# Patient Record
Sex: MALE | Race: WHITE | Employment: FULL TIME | URBAN - METROPOLITAN AREA
[De-identification: names, ages, dates, MRNs, and addresses within clinical notes are randomized per-mention and may not be internally consistent; named-entity substitution may affect disease eponyms.]

---

## 2017-03-08 ENCOUNTER — TELEPHONE (OUTPATIENT)
Dept: INTERNAL MEDICINE CLINIC | Facility: CLINIC | Age: 42
End: 2017-03-08

## 2017-03-08 NOTE — TELEPHONE ENCOUNTER
At visit in November 2016 patient was diagnosed with DM. Did not ever attend DM education. Has not followed up since. Writer left voice message for patient to schedule follow up office visit for DM follow up and also have DM eye exam done.

## 2017-04-13 ENCOUNTER — TELEPHONE (OUTPATIENT)
Dept: ENDOCRINOLOGY CLINIC | Facility: CLINIC | Age: 42
End: 2017-04-13

## 2017-04-17 DIAGNOSIS — E11.9 TYPE 2 DIABETES MELLITUS WITHOUT COMPLICATION, WITHOUT LONG-TERM CURRENT USE OF INSULIN (HCC): Primary | ICD-10-CM

## 2017-04-17 RX ORDER — ESCITALOPRAM OXALATE 10 MG/1
TABLET ORAL
Qty: 30 TABLET | Refills: 0 | Status: SHIPPED | OUTPATIENT
Start: 2017-04-17 | End: 2018-12-19

## 2017-04-17 NOTE — TELEPHONE ENCOUNTER
E request received  LOV: 11/17/16- with JV- physical  Last rx 12/5/16- 30 with 1   Per protocol to provider

## 2017-05-18 ENCOUNTER — PATIENT OUTREACH (OUTPATIENT)
Dept: INTERNAL MEDICINE CLINIC | Facility: CLINIC | Age: 42
End: 2017-05-18

## 2017-07-03 NOTE — PROGRESS NOTES
Patient is due/overdue for labs and office visit. Has had numerous attempts made to reach him and schedule visit. Please call and try to get him to complete labs (ordered) and schedule office visit for DM follow up. Thanks.

## 2017-07-10 PROCEDURE — 87081 CULTURE SCREEN ONLY: CPT | Performed by: PHYSICIAN ASSISTANT

## 2017-07-11 NOTE — PROGRESS NOTES
Future Appointments  Date Time Provider Franco Bowden   7/13/2017 10:15 AM Zac Suresh, NP [facility]

## 2017-07-13 ENCOUNTER — OFFICE VISIT (OUTPATIENT)
Dept: INTERNAL MEDICINE CLINIC | Facility: CLINIC | Age: 42
End: 2017-07-13

## 2017-07-13 ENCOUNTER — APPOINTMENT (OUTPATIENT)
Dept: LAB | Age: 42
End: 2017-07-13
Attending: NURSE PRACTITIONER
Payer: COMMERCIAL

## 2017-07-13 VITALS
WEIGHT: 222 LBS | HEIGHT: 70 IN | BODY MASS INDEX: 31.78 KG/M2 | HEART RATE: 76 BPM | SYSTOLIC BLOOD PRESSURE: 112 MMHG | RESPIRATION RATE: 16 BRPM | DIASTOLIC BLOOD PRESSURE: 62 MMHG

## 2017-07-13 DIAGNOSIS — E11.9 TYPE 2 DIABETES MELLITUS WITHOUT COMPLICATION, WITHOUT LONG-TERM CURRENT USE OF INSULIN (HCC): ICD-10-CM

## 2017-07-13 DIAGNOSIS — J06.9 ACUTE URI: ICD-10-CM

## 2017-07-13 DIAGNOSIS — J45.20 MILD INTERMITTENT ASTHMA WITHOUT COMPLICATION: ICD-10-CM

## 2017-07-13 DIAGNOSIS — E11.9 TYPE 2 DIABETES MELLITUS WITHOUT COMPLICATION, WITHOUT LONG-TERM CURRENT USE OF INSULIN (HCC): Primary | ICD-10-CM

## 2017-07-13 DIAGNOSIS — E78.5 HYPERLIPIDEMIA, UNSPECIFIED HYPERLIPIDEMIA TYPE: ICD-10-CM

## 2017-07-13 DIAGNOSIS — F17.200 SMOKER: ICD-10-CM

## 2017-07-13 LAB
ALBUMIN SERPL-MCNC: 3.9 G/DL (ref 3.5–4.8)
ALP LIVER SERPL-CCNC: 77 U/L (ref 45–117)
ALT SERPL-CCNC: 39 U/L (ref 17–63)
AST SERPL-CCNC: 10 U/L (ref 15–41)
BILIRUB SERPL-MCNC: 0.3 MG/DL (ref 0.1–2)
BUN BLD-MCNC: 18 MG/DL (ref 8–20)
CALCIUM BLD-MCNC: 8.9 MG/DL (ref 8.3–10.3)
CHLORIDE: 106 MMOL/L (ref 101–111)
CHOLEST SMN-MCNC: 188 MG/DL (ref ?–200)
CO2: 24 MMOL/L (ref 22–32)
CREAT BLD-MCNC: 0.82 MG/DL (ref 0.7–1.3)
CREAT UR-SCNC: 113 MG/DL
EST. AVERAGE GLUCOSE BLD GHB EST-MCNC: 163 MG/DL (ref 68–126)
GLUCOSE BLD-MCNC: 153 MG/DL (ref 70–99)
HBA1C MFR BLD HPLC: 7.3 % (ref ?–5.7)
HDLC SERPL-MCNC: 37 MG/DL (ref 45–?)
HDLC SERPL: 5.08 {RATIO} (ref ?–4.97)
LDLC SERPL CALC-MCNC: 128 MG/DL (ref ?–130)
M PROTEIN MFR SERPL ELPH: 7.1 G/DL (ref 6.1–8.3)
MICROALBUMIN UR-MCNC: 1.82 MG/DL
MICROALBUMIN/CREAT 24H UR-RTO: 16.1 UG/MG (ref ?–30)
NONHDLC SERPL-MCNC: 151 MG/DL (ref ?–130)
POTASSIUM SERPL-SCNC: 4.4 MMOL/L (ref 3.6–5.1)
SODIUM SERPL-SCNC: 139 MMOL/L (ref 136–144)
TRIGLYCERIDES: 114 MG/DL (ref ?–150)
VLDL: 23 MG/DL (ref 5–40)

## 2017-07-13 PROCEDURE — 82570 ASSAY OF URINE CREATININE: CPT | Performed by: NURSE PRACTITIONER

## 2017-07-13 PROCEDURE — 82043 UR ALBUMIN QUANTITATIVE: CPT | Performed by: NURSE PRACTITIONER

## 2017-07-13 PROCEDURE — 80061 LIPID PANEL: CPT | Performed by: NURSE PRACTITIONER

## 2017-07-13 PROCEDURE — 80053 COMPREHEN METABOLIC PANEL: CPT | Performed by: NURSE PRACTITIONER

## 2017-07-13 PROCEDURE — 99214 OFFICE O/P EST MOD 30 MIN: CPT | Performed by: NURSE PRACTITIONER

## 2017-07-13 PROCEDURE — 83036 HEMOGLOBIN GLYCOSYLATED A1C: CPT | Performed by: NURSE PRACTITIONER

## 2017-07-13 RX ORDER — ALBUTEROL SULFATE 90 UG/1
2 AEROSOL, METERED RESPIRATORY (INHALATION) EVERY 4 HOURS PRN
Qty: 1 INHALER | Refills: 6 | Status: SHIPPED | OUTPATIENT
Start: 2017-07-13 | End: 2018-03-02

## 2017-07-13 RX ORDER — MONTELUKAST SODIUM 10 MG/1
TABLET ORAL
Qty: 90 TABLET | Refills: 3 | Status: SHIPPED | OUTPATIENT
Start: 2017-07-13 | End: 2018-03-02

## 2017-07-13 NOTE — PATIENT INSTRUCTIONS
Gargle with warm salt water solution 3-5 times daily. Dissolve 1/2 teaspoon salt in half cup of warm tap water. Gargle and spit. Get plenty of rest and drink extra fluids.      Take all antibiotics as prescribed.  Do not stop taking them, even if you fe

## 2017-07-13 NOTE — PROGRESS NOTES
Patient presents with:  Diabetes: patient here for follow up, has not been taking his Metformin until this AM, no recent labs completed but pt is fasting, requesting refills for asthma and diabetes, pt also requesting glucose meter and supply script      H Prescriptions:  Albuterol Sulfate HFA (PROAIR HFA) 108 (90 Base) MCG/ACT Inhalation Aero Soln Inhale 2 puffs into the lungs every 4 (four) hours as needed for Wheezing.  Disp: 1 Inhaler Rfl: 6   MetFORMIN HCl 500 MG Oral Tab TAKE 1 TABLET BY MOUTH DAILY WIT No erythema. No pallor. A/P:    Type 2 diabetes mellitus without complication, without long-term current use of insulin (hcc)  (primary encounter diagnosis)- Discussed taking medications as ordered and making better diet choices.  Reviewed effects of Montelukast Sodium 10 MG Oral Tab 90 tablet 3      Sig: TAKE 1 TABLET BY MOUTH NIGHTLY. Glucose Blood (GOLDEN CONTOUR NEXT TEST) In Vitro Strip 100 strip 1      Sig: Once daily  as directed.       GOLDEN MICROLET LANCETS Does not apply Misc 100 Box 1

## 2017-07-18 ENCOUNTER — TELEPHONE (OUTPATIENT)
Dept: INTERNAL MEDICINE CLINIC | Facility: CLINIC | Age: 42
End: 2017-07-18

## 2017-07-18 NOTE — TELEPHONE ENCOUNTER
Out reach call. LM for pt to schedule his annual diabetic eye exam and then to have the specialist fax us a copy once this is complete.

## 2017-11-13 ENCOUNTER — TELEPHONE (OUTPATIENT)
Dept: INTERNAL MEDICINE CLINIC | Facility: CLINIC | Age: 42
End: 2017-11-13

## 2018-03-02 ENCOUNTER — OFFICE VISIT (OUTPATIENT)
Dept: INTERNAL MEDICINE CLINIC | Facility: CLINIC | Age: 43
End: 2018-03-02

## 2018-03-02 VITALS
TEMPERATURE: 98 F | DIASTOLIC BLOOD PRESSURE: 78 MMHG | SYSTOLIC BLOOD PRESSURE: 122 MMHG | WEIGHT: 213 LBS | HEIGHT: 70 IN | RESPIRATION RATE: 17 BRPM | HEART RATE: 76 BPM | BODY MASS INDEX: 30.49 KG/M2

## 2018-03-02 DIAGNOSIS — J45.20 MILD INTERMITTENT ASTHMA WITHOUT COMPLICATION: ICD-10-CM

## 2018-03-02 DIAGNOSIS — Z13.0 SCREENING FOR BLOOD DISEASE: ICD-10-CM

## 2018-03-02 DIAGNOSIS — Z13.29 SCREENING FOR THYROID DISORDER: ICD-10-CM

## 2018-03-02 DIAGNOSIS — Z12.5 SCREENING FOR PROSTATE CANCER: ICD-10-CM

## 2018-03-02 DIAGNOSIS — E78.5 HYPERLIPIDEMIA, UNSPECIFIED HYPERLIPIDEMIA TYPE: ICD-10-CM

## 2018-03-02 DIAGNOSIS — F17.200 SMOKER: ICD-10-CM

## 2018-03-02 DIAGNOSIS — G47.26 SHIFT WORK SLEEP DISORDER: ICD-10-CM

## 2018-03-02 DIAGNOSIS — E11.9 TYPE 2 DIABETES MELLITUS WITHOUT COMPLICATION, WITHOUT LONG-TERM CURRENT USE OF INSULIN (HCC): ICD-10-CM

## 2018-03-02 DIAGNOSIS — Z00.00 ROUTINE PHYSICAL EXAMINATION: Primary | ICD-10-CM

## 2018-03-02 PROCEDURE — 99213 OFFICE O/P EST LOW 20 MIN: CPT | Performed by: NURSE PRACTITIONER

## 2018-03-02 PROCEDURE — 99396 PREV VISIT EST AGE 40-64: CPT | Performed by: NURSE PRACTITIONER

## 2018-03-02 RX ORDER — BUDESONIDE AND FORMOTEROL FUMARATE DIHYDRATE 160; 4.5 UG/1; UG/1
2 AEROSOL RESPIRATORY (INHALATION) DAILY
Qty: 1 INHALER | Refills: 0 | COMMUNITY
Start: 2018-03-02 | End: 2018-04-13

## 2018-03-02 RX ORDER — ALBUTEROL SULFATE 90 UG/1
2 AEROSOL, METERED RESPIRATORY (INHALATION) EVERY 4 HOURS PRN
Qty: 1 INHALER | Refills: 6 | Status: SHIPPED | OUTPATIENT
Start: 2018-03-02 | End: 2018-10-16

## 2018-03-02 RX ORDER — MONTELUKAST SODIUM 10 MG/1
TABLET ORAL
Qty: 90 TABLET | Refills: 3 | Status: SHIPPED | OUTPATIENT
Start: 2018-03-02 | End: 2019-04-16

## 2018-03-02 NOTE — PROGRESS NOTES
Fran Spears is a 38year old male who presents for a complete physical exam.     HPI:   Pt complains of difficulty sleeping and fatigue related to shift-sleep disorder. Is a  and works \"crazy\" hours.  Frequently long hours and frequen ----------  ALT (U/L)   Date Value   08/23/2013 49   ----------  Alt (U/L)   Date Value   07/13/2017 39   11/17/2016 54   08/17/2015 30   ----------     Current Outpatient Prescriptions:  Budesonide-Formoterol Fumarate (SYMBICORT) 160-4.5 MCG/ACT Inhalat unusual skin lesions, rash or pruritis. EYES: denies blurred/double vision, light sensitivity or visual disturbances. HEENT: denies nasal congestion, sinus pain/tenderness. Denies neck stiffness.   LUNGS: as above  CARDIOVASCULAR: denies chest pain on exe parameters. NEURO: A/Ox3, cranial nerves II-XII intact, motor/sensory function grossly intact.        ASSESSMENT AND PLAN:   Marla Walker is a 38year old male who presents for a complete physical exam.     HEALTH MAINTENANCE:    Routine physical examina Imaging & Consults:  None    Return in about 6 weeks (around 4/13/2018). There are no Patient Instructions on file for this visit.

## 2018-03-05 ENCOUNTER — TELEPHONE (OUTPATIENT)
Dept: INTERNAL MEDICINE CLINIC | Facility: CLINIC | Age: 43
End: 2018-03-05

## 2018-03-05 PROBLEM — G47.26 SHIFT WORK SLEEP DISORDER: Status: ACTIVE | Noted: 2018-03-05

## 2018-03-05 NOTE — TELEPHONE ENCOUNTER
FMLA forms have been filled out by Marissa Calderon was seen by provider 3/2/18. Patient aware these were faxed with confirmation received and per patient request a copy was mailed home.

## 2018-03-06 ENCOUNTER — TELEPHONE (OUTPATIENT)
Dept: ENDOCRINOLOGY CLINIC | Facility: CLINIC | Age: 43
End: 2018-03-06

## 2018-04-13 ENCOUNTER — LAB ENCOUNTER (OUTPATIENT)
Dept: LAB | Age: 43
End: 2018-04-13
Attending: NURSE PRACTITIONER
Payer: COMMERCIAL

## 2018-04-13 ENCOUNTER — OFFICE VISIT (OUTPATIENT)
Dept: INTERNAL MEDICINE CLINIC | Facility: CLINIC | Age: 43
End: 2018-04-13

## 2018-04-13 VITALS
DIASTOLIC BLOOD PRESSURE: 72 MMHG | WEIGHT: 219 LBS | HEART RATE: 84 BPM | HEIGHT: 69 IN | BODY MASS INDEX: 32.44 KG/M2 | RESPIRATION RATE: 17 BRPM | TEMPERATURE: 98 F | SYSTOLIC BLOOD PRESSURE: 110 MMHG

## 2018-04-13 DIAGNOSIS — G47.26 SHIFT WORK SLEEP DISORDER: ICD-10-CM

## 2018-04-13 DIAGNOSIS — J45.20 MILD INTERMITTENT ASTHMA WITHOUT COMPLICATION: Primary | ICD-10-CM

## 2018-04-13 DIAGNOSIS — Z13.29 SCREENING FOR THYROID DISORDER: ICD-10-CM

## 2018-04-13 DIAGNOSIS — E11.9 TYPE 2 DIABETES MELLITUS WITHOUT COMPLICATION, WITHOUT LONG-TERM CURRENT USE OF INSULIN (HCC): ICD-10-CM

## 2018-04-13 DIAGNOSIS — Z13.0 SCREENING FOR BLOOD DISEASE: ICD-10-CM

## 2018-04-13 DIAGNOSIS — Z12.5 SCREENING FOR PROSTATE CANCER: ICD-10-CM

## 2018-04-13 DIAGNOSIS — E78.5 HYPERLIPIDEMIA, UNSPECIFIED HYPERLIPIDEMIA TYPE: ICD-10-CM

## 2018-04-13 PROCEDURE — 80061 LIPID PANEL: CPT | Performed by: NURSE PRACTITIONER

## 2018-04-13 PROCEDURE — 83036 HEMOGLOBIN GLYCOSYLATED A1C: CPT | Performed by: NURSE PRACTITIONER

## 2018-04-13 PROCEDURE — 99213 OFFICE O/P EST LOW 20 MIN: CPT | Performed by: NURSE PRACTITIONER

## 2018-04-13 PROCEDURE — 80050 GENERAL HEALTH PANEL: CPT | Performed by: NURSE PRACTITIONER

## 2018-04-13 PROCEDURE — 84153 ASSAY OF PSA TOTAL: CPT | Performed by: NURSE PRACTITIONER

## 2018-04-13 RX ORDER — BUDESONIDE AND FORMOTEROL FUMARATE DIHYDRATE 160; 4.5 UG/1; UG/1
2 AEROSOL RESPIRATORY (INHALATION) DAILY
Qty: 1 INHALER | Refills: 3 | Status: SHIPPED | OUTPATIENT
Start: 2018-04-13 | End: 2018-12-19

## 2018-04-13 NOTE — PROGRESS NOTES
Patient presents with:  Asthma: Room 2 AH- asthma follow up, patient is fasting for labs      HPI:  Presents for follow up of asthma, at last visit was started on Symbicort 160/4.5 daily. In office today stated feeling much better.  Denies wheezing, cough, daily  as directed. Disp: 100 strip Rfl: 1   GOLDEN MICROLET LANCETS Does not apply Misc 1 lancet by Finger stick route daily. Use as directed. Disp: 100 Box Rfl: 1   ESCITALOPRAM 10 MG Oral Tab TAKE 1 TABLET BY MOUTH ONCE DAILY.  Disp: 30 tablet Rfl: 0

## 2018-04-16 ENCOUNTER — TELEPHONE (OUTPATIENT)
Dept: INTERNAL MEDICINE CLINIC | Facility: CLINIC | Age: 43
End: 2018-04-16

## 2018-04-16 NOTE — TELEPHONE ENCOUNTER
Pt was in to see provider today and had Zak Ramos forms for completion by Kisha Johns. Form completed, signed and faxed back to [employer] at [phone]. Confirmation received and originals to scanning.

## 2018-10-16 NOTE — TELEPHONE ENCOUNTER
Did not pass protocol- pt due for appt   Per LOV from 4/13/18 no f/u on file  Per protocol routed to provider

## 2018-11-16 ENCOUNTER — TELEPHONE (OUTPATIENT)
Dept: INTERNAL MEDICINE CLINIC | Facility: CLINIC | Age: 43
End: 2018-11-16

## 2018-11-16 NOTE — TELEPHONE ENCOUNTER
Pt is due for DM f/u visit. Please call to schedule.  Was last seeing JV but has seen AS and SD in the past.

## 2018-11-28 NOTE — TELEPHONE ENCOUNTER
Did not pass protocol-pt due for A1c and Microalbumin  PSR please call pt to schedule appt pt is overdue  Per protocol routed to provider

## 2018-11-30 NOTE — TELEPHONE ENCOUNTER
Future Appointments   Date Time Provider Franco oBwden   12/19/2018  9:15 AM Susie Vasquez, APRN [facility]

## 2018-12-19 ENCOUNTER — OFFICE VISIT (OUTPATIENT)
Dept: INTERNAL MEDICINE CLINIC | Facility: CLINIC | Age: 43
End: 2018-12-19
Payer: COMMERCIAL

## 2018-12-19 VITALS
HEART RATE: 80 BPM | WEIGHT: 209 LBS | SYSTOLIC BLOOD PRESSURE: 102 MMHG | TEMPERATURE: 98 F | DIASTOLIC BLOOD PRESSURE: 68 MMHG | HEIGHT: 70 IN | BODY MASS INDEX: 29.92 KG/M2

## 2018-12-19 DIAGNOSIS — E78.5 HYPERLIPIDEMIA, UNSPECIFIED HYPERLIPIDEMIA TYPE: Primary | ICD-10-CM

## 2018-12-19 DIAGNOSIS — F17.200 SMOKER: ICD-10-CM

## 2018-12-19 DIAGNOSIS — G47.33 OSA (OBSTRUCTIVE SLEEP APNEA): ICD-10-CM

## 2018-12-19 DIAGNOSIS — K21.9 GASTROESOPHAGEAL REFLUX DISEASE, ESOPHAGITIS PRESENCE NOT SPECIFIED: ICD-10-CM

## 2018-12-19 DIAGNOSIS — F41.9 ANXIETY: ICD-10-CM

## 2018-12-19 DIAGNOSIS — J45.20 MILD INTERMITTENT ASTHMA WITHOUT COMPLICATION: ICD-10-CM

## 2018-12-19 DIAGNOSIS — E11.9 TYPE 2 DIABETES MELLITUS WITHOUT COMPLICATION, WITHOUT LONG-TERM CURRENT USE OF INSULIN (HCC): ICD-10-CM

## 2018-12-19 PROCEDURE — 99214 OFFICE O/P EST MOD 30 MIN: CPT | Performed by: NURSE PRACTITIONER

## 2018-12-19 RX ORDER — ESCITALOPRAM OXALATE 10 MG/1
10 TABLET ORAL
Qty: 30 TABLET | Refills: 3 | Status: SHIPPED | OUTPATIENT
Start: 2018-12-19 | End: 2019-06-21

## 2018-12-19 RX ORDER — VARENICLINE TARTRATE 1 MG/1
1 TABLET, FILM COATED ORAL 2 TIMES DAILY
Qty: 60 TABLET | Refills: 6 | Status: SHIPPED | OUTPATIENT
Start: 2018-12-19 | End: 2019-02-27

## 2018-12-19 RX ORDER — BUDESONIDE AND FORMOTEROL FUMARATE DIHYDRATE 160; 4.5 UG/1; UG/1
2 AEROSOL RESPIRATORY (INHALATION) DAILY
Qty: 1 INHALER | Refills: 3 | Status: SHIPPED | OUTPATIENT
Start: 2018-12-19 | End: 2019-03-21

## 2018-12-19 RX ORDER — ALBUTEROL SULFATE 90 UG/1
AEROSOL, METERED RESPIRATORY (INHALATION)
Qty: 8.5 G | Refills: 3 | Status: SHIPPED | OUTPATIENT
Start: 2018-12-19 | End: 2019-05-24

## 2018-12-19 NOTE — PROGRESS NOTES
Vishnu Mancilla is a 41year old male. Patient presents with:  Diabetes: LG. Room 11. Needs eye exam and urine microalbumin      HPI:   Here for follow up diabetes and asthma. Diabetes   Metformin 500mg daily  Due for labs.   (a1c not working in the office STARTING MONTH [place]) 0.5 MG X 11 & 1 MG X 42 Oral Misc Take 0.5 mg by mouth 2 (two) times daily. Take as directed Disp: 1 Package Rfl: 0   Varenicline Tartrate (CHANTIX) 1 MG Oral Tab Take 1 tablet (1 mg total) by mouth 2 (two) times daily.  Disp: 60 tablet Gastroesophageal reflux disease, esophagitis presence not specified  Stable   Anxiety  lexapro    Orders Placed This Encounter      CMP now      Lipids now      Hemoglobin A1C now      Microalb/Creat Ratio, now      Meds & Refills for this Visit:  Dung

## 2019-02-26 ENCOUNTER — TELEPHONE (OUTPATIENT)
Dept: INTERNAL MEDICINE CLINIC | Facility: CLINIC | Age: 44
End: 2019-02-26

## 2019-02-26 NOTE — TELEPHONE ENCOUNTER
Pt saw SD in Dec 2018. Overdue for DM labs and was to f/u in March 2019, nothing scheduled.   Please call pt, remind to do fasting labs ASAP and schedule f/u OV with SD.

## 2019-02-27 NOTE — TELEPHONE ENCOUNTER
Future Appointments   Date Time Provider Franco Bowden   3/11/2019 10:00 AM ERIC Conklin [facility]     Aware must complete fasting labs

## 2019-03-13 ENCOUNTER — APPOINTMENT (OUTPATIENT)
Dept: LAB | Age: 44
End: 2019-03-13
Attending: NURSE PRACTITIONER
Payer: COMMERCIAL

## 2019-03-13 DIAGNOSIS — E11.9 TYPE 2 DIABETES MELLITUS WITHOUT COMPLICATION, WITHOUT LONG-TERM CURRENT USE OF INSULIN (HCC): ICD-10-CM

## 2019-03-13 LAB
ALBUMIN SERPL-MCNC: 4 G/DL (ref 3.4–5)
ALBUMIN/GLOB SERPL: 1.4 {RATIO} (ref 1–2)
ALP LIVER SERPL-CCNC: 72 U/L (ref 45–117)
ALT SERPL-CCNC: 32 U/L (ref 16–61)
ANION GAP SERPL CALC-SCNC: 7 MMOL/L (ref 0–18)
AST SERPL-CCNC: 10 U/L (ref 15–37)
BILIRUB SERPL-MCNC: 0.6 MG/DL (ref 0.1–2)
BUN BLD-MCNC: 14 MG/DL (ref 7–18)
BUN/CREAT SERPL: 13.9 (ref 10–20)
CALCIUM BLD-MCNC: 9.2 MG/DL (ref 8.5–10.1)
CHLORIDE SERPL-SCNC: 108 MMOL/L (ref 98–107)
CHOLEST SMN-MCNC: 189 MG/DL (ref ?–200)
CO2 SERPL-SCNC: 25 MMOL/L (ref 21–32)
CREAT BLD-MCNC: 1.01 MG/DL (ref 0.7–1.3)
CREAT UR-SCNC: 193 MG/DL
EST. AVERAGE GLUCOSE BLD GHB EST-MCNC: 151 MG/DL (ref 68–126)
GLOBULIN PLAS-MCNC: 2.9 G/DL (ref 2.8–4.4)
GLUCOSE BLD-MCNC: 124 MG/DL (ref 70–99)
HBA1C MFR BLD HPLC: 6.9 % (ref ?–5.7)
HDLC SERPL-MCNC: 37 MG/DL (ref 40–59)
LDLC SERPL CALC-MCNC: 123 MG/DL (ref ?–100)
M PROTEIN MFR SERPL ELPH: 6.9 G/DL (ref 6.4–8.2)
MICROALBUMIN UR-MCNC: 2.46 MG/DL
MICROALBUMIN/CREAT 24H UR-RTO: 12.7 UG/MG (ref ?–30)
NONHDLC SERPL-MCNC: 152 MG/DL (ref ?–130)
OSMOLALITY SERPL CALC.SUM OF ELEC: 292 MOSM/KG (ref 275–295)
POTASSIUM SERPL-SCNC: 4.3 MMOL/L (ref 3.5–5.1)
SODIUM SERPL-SCNC: 140 MMOL/L (ref 136–145)
TRIGL SERPL-MCNC: 143 MG/DL (ref 30–149)
VLDLC SERPL CALC-MCNC: 29 MG/DL (ref 0–30)

## 2019-03-13 PROCEDURE — 83036 HEMOGLOBIN GLYCOSYLATED A1C: CPT | Performed by: NURSE PRACTITIONER

## 2019-03-13 PROCEDURE — 82043 UR ALBUMIN QUANTITATIVE: CPT | Performed by: NURSE PRACTITIONER

## 2019-03-13 PROCEDURE — 80061 LIPID PANEL: CPT | Performed by: NURSE PRACTITIONER

## 2019-03-13 PROCEDURE — 82570 ASSAY OF URINE CREATININE: CPT | Performed by: NURSE PRACTITIONER

## 2019-03-13 PROCEDURE — 80053 COMPREHEN METABOLIC PANEL: CPT | Performed by: NURSE PRACTITIONER

## 2019-03-19 ENCOUNTER — APPOINTMENT (OUTPATIENT)
Dept: LAB | Age: 44
End: 2019-03-19
Attending: INTERNAL MEDICINE
Payer: COMMERCIAL

## 2019-03-19 ENCOUNTER — OFFICE VISIT (OUTPATIENT)
Dept: INTERNAL MEDICINE CLINIC | Facility: CLINIC | Age: 44
End: 2019-03-19
Payer: COMMERCIAL

## 2019-03-19 VITALS
DIASTOLIC BLOOD PRESSURE: 78 MMHG | BODY MASS INDEX: 30.35 KG/M2 | SYSTOLIC BLOOD PRESSURE: 130 MMHG | HEART RATE: 68 BPM | RESPIRATION RATE: 16 BRPM | HEIGHT: 70 IN | TEMPERATURE: 98 F | WEIGHT: 212 LBS

## 2019-03-19 DIAGNOSIS — J45.20 MILD INTERMITTENT ASTHMA WITHOUT COMPLICATION: ICD-10-CM

## 2019-03-19 DIAGNOSIS — E78.5 HYPERLIPIDEMIA, UNSPECIFIED HYPERLIPIDEMIA TYPE: ICD-10-CM

## 2019-03-19 DIAGNOSIS — R09.81 SINUS CONGESTION: ICD-10-CM

## 2019-03-19 DIAGNOSIS — E11.9 TYPE 2 DIABETES MELLITUS WITHOUT COMPLICATION, WITHOUT LONG-TERM CURRENT USE OF INSULIN (HCC): ICD-10-CM

## 2019-03-19 DIAGNOSIS — K21.9 GASTROESOPHAGEAL REFLUX DISEASE, ESOPHAGITIS PRESENCE NOT SPECIFIED: ICD-10-CM

## 2019-03-19 DIAGNOSIS — F17.200 SMOKER: ICD-10-CM

## 2019-03-19 DIAGNOSIS — G47.33 OSA (OBSTRUCTIVE SLEEP APNEA): ICD-10-CM

## 2019-03-19 DIAGNOSIS — R53.83 FATIGUE, UNSPECIFIED TYPE: Primary | ICD-10-CM

## 2019-03-19 DIAGNOSIS — R53.83 FATIGUE, UNSPECIFIED TYPE: ICD-10-CM

## 2019-03-19 LAB
TSI SER-ACNC: 1.29 MIU/ML (ref 0.36–3.74)
VIT B12 SERPL-MCNC: 453 PG/ML (ref 193–986)
VIT D+METAB SERPL-MCNC: 15.5 NG/ML (ref 30–100)

## 2019-03-19 PROCEDURE — 84443 ASSAY THYROID STIM HORMONE: CPT | Performed by: NURSE PRACTITIONER

## 2019-03-19 PROCEDURE — 99214 OFFICE O/P EST MOD 30 MIN: CPT | Performed by: NURSE PRACTITIONER

## 2019-03-19 PROCEDURE — 82306 VITAMIN D 25 HYDROXY: CPT | Performed by: NURSE PRACTITIONER

## 2019-03-19 PROCEDURE — 82607 VITAMIN B-12: CPT | Performed by: NURSE PRACTITIONER

## 2019-03-19 RX ORDER — RANITIDINE 150 MG/1
150 TABLET ORAL 2 TIMES DAILY
Qty: 180 TABLET | Refills: 1 | Status: SHIPPED | OUTPATIENT
Start: 2019-03-19 | End: 2020-01-31 | Stop reason: ALTCHOICE

## 2019-03-19 RX ORDER — ATORVASTATIN CALCIUM 10 MG/1
10 TABLET, FILM COATED ORAL DAILY
Qty: 90 TABLET | Refills: 3 | Status: SHIPPED | OUTPATIENT
Start: 2019-03-19 | End: 2020-01-31

## 2019-03-19 NOTE — PROGRESS NOTES
Bonny Willams is a 41year old male. Patient presents with: Follow - Up: LB-rm 10      HPI:     Here for follow up     Asthma  Stable   singulair and symbicort with PRN proair. Reviewed and agree with plan as discussed.      Diabetes  a1c 6.9 from 6.7  M daily for 7 days, THEN 1 tablet (150 mg total) 2 (two) times daily.  Disp: 173 tablet Rfl: 1   Albuterol Sulfate HFA (PROAIR HFA) 108 (90 Base) MCG/ACT Inhalation Aero Soln INHALE TWO PUFFS BY MOUTH EVERY FOUR HOURS AS NEEDED for wheezing Disp: 8.5 g Rfl: 3 wounds. Pedal pulses assessed      ASSESSMENT AND PLAN:     Fatigue, unspecified type  (primary encounter diagnosis)  Check labs b12 and vit d   Sleep study as above   Hyperlipidemia, unspecified hyperlipidemia type  Start lipitor  Asthma  Same mangement.

## 2019-03-20 DIAGNOSIS — E55.9 VITAMIN D DEFICIENCY: Primary | ICD-10-CM

## 2019-03-20 RX ORDER — ERGOCALCIFEROL 1.25 MG/1
50000 CAPSULE ORAL WEEKLY
Qty: 12 CAPSULE | Refills: 0 | Status: SHIPPED | OUTPATIENT
Start: 2019-03-20 | End: 2019-04-19

## 2019-03-21 RX ORDER — BUDESONIDE AND FORMOTEROL FUMARATE DIHYDRATE 160; 4.5 UG/1; UG/1
AEROSOL RESPIRATORY (INHALATION)
Qty: 1 INHALER | Refills: 5 | Status: SHIPPED | OUTPATIENT
Start: 2019-03-21 | End: 2020-01-31

## 2019-04-17 RX ORDER — MONTELUKAST SODIUM 10 MG/1
TABLET ORAL
Qty: 90 TABLET | Refills: 1 | Status: SHIPPED | OUTPATIENT
Start: 2019-04-17 | End: 2020-01-24

## 2019-05-24 RX ORDER — ALBUTEROL SULFATE 90 UG/1
AEROSOL, METERED RESPIRATORY (INHALATION)
Qty: 8.5 G | Refills: 2 | Status: SHIPPED | OUTPATIENT
Start: 2019-05-24 | End: 2019-08-22

## 2019-06-11 DIAGNOSIS — E55.9 VITAMIN D DEFICIENCY: ICD-10-CM

## 2019-06-11 RX ORDER — ERGOCALCIFEROL 1.25 MG/1
50000 CAPSULE ORAL WEEKLY
Qty: 12 CAPSULE | Refills: 0 | OUTPATIENT
Start: 2019-06-11 | End: 2019-07-11

## 2019-06-11 NOTE — TELEPHONE ENCOUNTER
Last Ov: 3/19/19, SD, F/U  Upcoming appt: no upcoming scheduled appt  Last labs: TSH w Ref T4, Vit D, Vit B12 3/19/19  Last Rx: ergocalciferol 42789 IU #12, 0 R 3/20/19    Per Protocol, not on protocol.  Rx pending, please sign if appropriate    Left detail

## 2019-06-21 RX ORDER — ESCITALOPRAM OXALATE 10 MG/1
TABLET ORAL
Qty: 90 TABLET | Refills: 0 | Status: SHIPPED | OUTPATIENT
Start: 2019-06-21 | End: 2019-09-17

## 2019-06-21 NOTE — TELEPHONE ENCOUNTER
RCE:0/7456 SD  FOV:none on file   LAST RX:12/19/18 10 mg take 1 tab daily 30 tabs 3 refills   LAST LABS:3/19/19 vit b12,vit D,tsh  PER PROTOCOL: to provider

## 2019-08-23 RX ORDER — ALBUTEROL SULFATE 90 UG/1
AEROSOL, METERED RESPIRATORY (INHALATION)
Qty: 8.5 G | Refills: 2 | Status: SHIPPED | OUTPATIENT
Start: 2019-08-23 | End: 2019-11-09

## 2019-09-18 NOTE — TELEPHONE ENCOUNTER
Spoke with patient and reminded him to get blood work done and schedule f/u visit. Patient will schedule through [address]

## 2019-10-08 DIAGNOSIS — F41.9 ANXIETY: ICD-10-CM

## 2019-10-08 RX ORDER — ESCITALOPRAM OXALATE 10 MG/1
TABLET ORAL
Qty: 90 TABLET | Refills: 0 | Status: SHIPPED | OUTPATIENT
Start: 2019-10-08 | End: 2020-01-08

## 2019-10-08 NOTE — TELEPHONE ENCOUNTER
Last Ov: 3/19/19, SD, f/u  Upcoming appt: no upcoming appt  Last labs: TSH w Ref T4, Vit D, Vit B12 3/19/19  Last Rx: escitalopram 10mg, #30, 0R 9/17/19    Per Protocol, not on protocol. Rx pending. Pt notified on 9/17/19 due for labs/appt.  Labs incompl

## 2019-11-11 RX ORDER — ALBUTEROL SULFATE 90 UG/1
AEROSOL, METERED RESPIRATORY (INHALATION)
Qty: 8.5 G | Refills: 1 | Status: SHIPPED | OUTPATIENT
Start: 2019-11-11 | End: 2020-01-19

## 2019-12-16 NOTE — TELEPHONE ENCOUNTER
Protocol failed due to HgBA1C procedure resulted in past 6 months,Last HgBA1C < 7.5,Appointment in past 6 or next 3 months    Please advise,    LOV:3/19/19 SD  FOV:none on file   LAST RX:3/19/19 500 mg take 2 tabs daily 180 tabs 1 refill   LAST LABS:3/19/1

## 2020-01-08 DIAGNOSIS — E11.9 TYPE 2 DIABETES MELLITUS WITHOUT COMPLICATION, WITHOUT LONG-TERM CURRENT USE OF INSULIN (HCC): Primary | ICD-10-CM

## 2020-01-08 DIAGNOSIS — F41.9 ANXIETY: ICD-10-CM

## 2020-01-08 RX ORDER — ESCITALOPRAM OXALATE 10 MG/1
TABLET ORAL
Qty: 90 TABLET | Refills: 0 | Status: SHIPPED | OUTPATIENT
Start: 2020-01-08 | End: 2020-01-31

## 2020-01-08 NOTE — TELEPHONE ENCOUNTER
Last Ov: 3/19/19, SD, F/U  Upcoming appt: no upcoming appt  Last labs: Vit B12, Vit D, TSH w Ref 3/19/19  Last Rx: escitalopram 10mg, #90, 0R 10/8/19    Per Protocol, not on protocol. Rx pending.

## 2020-01-19 ENCOUNTER — PATIENT MESSAGE (OUTPATIENT)
Dept: INTERNAL MEDICINE CLINIC | Facility: CLINIC | Age: 45
End: 2020-01-19

## 2020-01-20 RX ORDER — ALBUTEROL SULFATE 90 UG/1
AEROSOL, METERED RESPIRATORY (INHALATION)
Qty: 8.5 G | Refills: 1 | Status: SHIPPED | OUTPATIENT
Start: 2020-01-20 | End: 2020-01-31

## 2020-01-24 RX ORDER — MONTELUKAST SODIUM 10 MG/1
TABLET ORAL
Qty: 90 TABLET | Refills: 0 | Status: SHIPPED | OUTPATIENT
Start: 2020-01-24 | End: 2020-01-31

## 2020-01-24 NOTE — TELEPHONE ENCOUNTER
Requesting Montelukast   LOV: 3/19/19  RTC: 6 months   Last Relevant Labs: pp  Filled: 4/17/19 #90 with 1 refills    Future Appointments   Date Time Provider Franco Bowden   1/31/2020 11:40 AM ERIC Amato [facility]     Patient has sc

## 2020-01-31 ENCOUNTER — OFFICE VISIT (OUTPATIENT)
Dept: INTERNAL MEDICINE CLINIC | Facility: CLINIC | Age: 45
End: 2020-01-31
Payer: COMMERCIAL

## 2020-01-31 ENCOUNTER — TELEPHONE (OUTPATIENT)
Dept: INTERNAL MEDICINE CLINIC | Facility: CLINIC | Age: 45
End: 2020-01-31

## 2020-01-31 VITALS
HEART RATE: 78 BPM | BODY MASS INDEX: 33.33 KG/M2 | TEMPERATURE: 98 F | DIASTOLIC BLOOD PRESSURE: 76 MMHG | RESPIRATION RATE: 16 BRPM | WEIGHT: 225 LBS | SYSTOLIC BLOOD PRESSURE: 128 MMHG | HEIGHT: 69 IN | OXYGEN SATURATION: 96 %

## 2020-01-31 DIAGNOSIS — Z13.0 SCREENING FOR BLOOD DISEASE: ICD-10-CM

## 2020-01-31 DIAGNOSIS — Z13.29 SCREENING FOR THYROID DISORDER: ICD-10-CM

## 2020-01-31 DIAGNOSIS — F41.9 ANXIETY: ICD-10-CM

## 2020-01-31 DIAGNOSIS — E11.9 TYPE 2 DIABETES MELLITUS WITHOUT COMPLICATION, WITHOUT LONG-TERM CURRENT USE OF INSULIN (HCC): ICD-10-CM

## 2020-01-31 DIAGNOSIS — Z00.00 ROUTINE GENERAL MEDICAL EXAMINATION AT A HEALTH CARE FACILITY: Primary | ICD-10-CM

## 2020-01-31 DIAGNOSIS — G47.26 SHIFT WORK SLEEP DISORDER: ICD-10-CM

## 2020-01-31 DIAGNOSIS — J45.20 MILD INTERMITTENT ASTHMA WITHOUT COMPLICATION: ICD-10-CM

## 2020-01-31 DIAGNOSIS — G47.33 OSA (OBSTRUCTIVE SLEEP APNEA): ICD-10-CM

## 2020-01-31 DIAGNOSIS — E78.5 HYPERLIPIDEMIA, UNSPECIFIED HYPERLIPIDEMIA TYPE: ICD-10-CM

## 2020-01-31 LAB
CARTRIDGE LOT#: 588 NUMERIC
HEMOGLOBIN A1C: 7.6 % (ref 4.3–5.6)

## 2020-01-31 PROCEDURE — 99396 PREV VISIT EST AGE 40-64: CPT | Performed by: NURSE PRACTITIONER

## 2020-01-31 PROCEDURE — 99214 OFFICE O/P EST MOD 30 MIN: CPT | Performed by: NURSE PRACTITIONER

## 2020-01-31 PROCEDURE — 83036 HEMOGLOBIN GLYCOSYLATED A1C: CPT | Performed by: NURSE PRACTITIONER

## 2020-01-31 RX ORDER — ESCITALOPRAM OXALATE 10 MG/1
10 TABLET ORAL
Qty: 90 TABLET | Refills: 3 | Status: SHIPPED | OUTPATIENT
Start: 2020-01-31 | End: 2021-08-05

## 2020-01-31 RX ORDER — BUDESONIDE AND FORMOTEROL FUMARATE DIHYDRATE 160; 4.5 UG/1; UG/1
2 AEROSOL RESPIRATORY (INHALATION) 2 TIMES DAILY
Qty: 3 INHALER | Refills: 3 | Status: SHIPPED | OUTPATIENT
Start: 2020-01-31 | End: 2021-11-10

## 2020-01-31 RX ORDER — ALBUTEROL SULFATE 90 UG/1
AEROSOL, METERED RESPIRATORY (INHALATION)
Qty: 8.5 G | Refills: 1 | Status: SHIPPED | OUTPATIENT
Start: 2020-01-31 | End: 2020-04-13

## 2020-01-31 RX ORDER — MONTELUKAST SODIUM 10 MG/1
TABLET ORAL
Qty: 90 TABLET | Refills: 3 | Status: SHIPPED | OUTPATIENT
Start: 2020-01-31 | End: 2021-10-05

## 2020-01-31 RX ORDER — ATORVASTATIN CALCIUM 10 MG/1
10 TABLET, FILM COATED ORAL DAILY
Qty: 90 TABLET | Refills: 3 | Status: SHIPPED | OUTPATIENT
Start: 2020-01-31 | End: 2021-01-25

## 2020-01-31 RX ORDER — BUPROPION HYDROCHLORIDE 150 MG/1
150 TABLET, EXTENDED RELEASE ORAL 2 TIMES DAILY
Qty: 180 TABLET | Refills: 1 | Status: SHIPPED | OUTPATIENT
Start: 2020-01-31

## 2020-01-31 NOTE — PROGRESS NOTES
Russell Sandoval is a 45year old male. Patient presents with:  Physical: RG rm 11 yearly check up      HPI:   Here for CPE but overdue for follow up as well. Asthma  singulair symbicort  PRN Proair   ACT score 20  Struggles a bit in winter.       Diabet tablet 3   • Budesonide-Formoterol Fumarate (SYMBICORT) 160-4.5 MCG/ACT Inhalation Aerosol Inhale 2 puffs into the lungs 2 (two) times daily. 3 Inhaler 3   • atorvastatin (LIPITOR) 10 MG Oral Tab Take 1 tablet (10 mg total) by mouth daily.  90 tablet 3   • normal strength and tone  PSYCH: alert and oriented x 3    ASSESSMENT AND PLAN:     Routine general medical examination at a health care facility  (primary encounter diagnosis)  Type 2 diabetes mellitus without complication, without long-term current use o total) by mouth daily. Imaging & Consults:  None    No follow-ups on file. There are no Patient Instructions on file for this visit.

## 2020-01-31 NOTE — TELEPHONE ENCOUNTER
Faxed completed FMLA forms to [employer] ([phone]) Transmission confirmation received,  and forms sent to scan. Copy of forms placed in SD upcoming appt folder.

## 2020-02-04 ENCOUNTER — PATIENT MESSAGE (OUTPATIENT)
Dept: INTERNAL MEDICINE CLINIC | Facility: CLINIC | Age: 45
End: 2020-02-04

## 2020-02-05 NOTE — TELEPHONE ENCOUNTER
From: Lisa Ivan  To: ERIC Acosta  Sent: 2/4/2020 5:29 PM CST  Subject: Kriag Leon,    I did receive your message about the [facility] paperwork being complete. Did your office fax it to my work, or do I need to come get it?  Thank you for ti

## 2020-02-13 ENCOUNTER — TELEPHONE (OUTPATIENT)
Dept: INTERNAL MEDICINE CLINIC | Facility: CLINIC | Age: 45
End: 2020-02-13

## 2020-03-10 NOTE — LETTER
ASTHMA ACTION PLAN for Kartik Pham     : 1975     Date: 3/19/2019  Provider:  ERIC Ledesma  Phone for doctor or clinic: [facility], [facility], [address] [place] [address] [phone]
clear

## 2020-04-13 RX ORDER — ALBUTEROL SULFATE 90 UG/1
AEROSOL, METERED RESPIRATORY (INHALATION)
Qty: 8.5 G | Refills: 1 | Status: SHIPPED | OUTPATIENT
Start: 2020-04-13 | End: 2020-06-15

## 2020-04-13 NOTE — TELEPHONE ENCOUNTER
Last VISIT 1.31.20 w/ SD    Last REFILL 1.31.20 Albuterol HFA 8.5g 1R    Last LABS 1.31.20 HgA1c    Future Appointments   Date Time Provider Franco Bowden   4/29/2020 11:20 AM ERIC Martinez [facility]         Per PROTOCOL?  FAILED    Ple

## 2020-04-29 ENCOUNTER — VIRTUAL PHONE E/M (OUTPATIENT)
Dept: INTERNAL MEDICINE CLINIC | Facility: CLINIC | Age: 45
End: 2020-04-29
Payer: COMMERCIAL

## 2020-04-29 DIAGNOSIS — J45.20 MILD INTERMITTENT ASTHMA WITHOUT COMPLICATION: ICD-10-CM

## 2020-04-29 DIAGNOSIS — F41.9 ANXIETY: ICD-10-CM

## 2020-04-29 DIAGNOSIS — F17.200 SMOKER: ICD-10-CM

## 2020-04-29 DIAGNOSIS — K21.9 GASTROESOPHAGEAL REFLUX DISEASE, ESOPHAGITIS PRESENCE NOT SPECIFIED: ICD-10-CM

## 2020-04-29 DIAGNOSIS — E11.9 TYPE 2 DIABETES MELLITUS WITHOUT COMPLICATION, WITHOUT LONG-TERM CURRENT USE OF INSULIN (HCC): Primary | ICD-10-CM

## 2020-04-29 DIAGNOSIS — G47.33 OSA (OBSTRUCTIVE SLEEP APNEA): ICD-10-CM

## 2020-04-29 DIAGNOSIS — E78.00 PURE HYPERCHOLESTEROLEMIA: ICD-10-CM

## 2020-04-29 PROCEDURE — 99214 OFFICE O/P EST MOD 30 MIN: CPT | Performed by: NURSE PRACTITIONER

## 2020-04-29 NOTE — PROGRESS NOTES
Due to COVID-19 ACTION PLAN, the patient's office visit was converted to a phone or video visit. This visit is conducted using live video and/or audio. The patient consents to this service.   The patient understands and accepts financial responsibility fo WHEEZING 8.5 g 1   • metFORMIN HCl 1000 MG Oral Tab Take 1 tablet (1,000 mg total) by mouth 2 (two) times daily with meals.  180 tablet 1   • Montelukast Sodium 10 MG Oral Tab TAKE ONE TABLET BY MOUTH IN THE EVENING 90 tablet 3   • escitalopram 10 MG Oral T No orders of the defined types were placed in this encounter.       Meds & Refills for this Visit:  Requested Prescriptions      No prescriptions requested or ordered in this encounter       Imaging & Consults:  None      Please note that the above visi

## 2020-06-15 RX ORDER — ALBUTEROL SULFATE 90 UG/1
AEROSOL, METERED RESPIRATORY (INHALATION)
Qty: 8.5 G | Refills: 0 | Status: SHIPPED | OUTPATIENT
Start: 2020-06-15 | End: 2020-09-18

## 2020-06-15 NOTE — TELEPHONE ENCOUNTER
Last Ov: 4/29/20, SD, virtual  Last labs: A1c 1/31/20  Last Rx: albuterol HFA 108mcg, 8.5g, 1R 4/13/20    No future appointments. Per Protocol - failed. Pt due for AAP/ACT. Rx pending.

## 2020-07-11 NOTE — TELEPHONE ENCOUNTER
protocol failed due to Last HgBA1C < 7.5,Microalbumin procedure in past 12 months or taking ACE/ARB    Please advise,    LOV:1/31/20 SD  FOV:none on file   LAST RX:1/31/20 1000 mg take 1 tab twice a day 180 tabs 1 refill   LAST LABS:1/31/20 a1c   PER JANETH

## 2020-07-21 ENCOUNTER — TELEPHONE (OUTPATIENT)
Dept: INTERNAL MEDICINE CLINIC | Facility: CLINIC | Age: 45
End: 2020-07-21

## 2020-07-21 NOTE — TELEPHONE ENCOUNTER
Received FMLA forms from pt to be filled out. Placed in [address] [place] [address] for review.

## 2020-07-22 NOTE — TELEPHONE ENCOUNTER
Future Appointments   Date Time Provider Franco Bowden   7/24/2020  1:00 PM Cody , ERIC [facility]

## 2020-07-22 NOTE — TELEPHONE ENCOUNTER
Pt is scheduled for below    Future Appointments   Date Time Provider Franco Bowden   7/24/2020  1:00 PM ERIC Boss [facility]

## 2020-07-24 ENCOUNTER — OFFICE VISIT (OUTPATIENT)
Dept: INTERNAL MEDICINE CLINIC | Facility: CLINIC | Age: 45
End: 2020-07-24
Payer: COMMERCIAL

## 2020-07-24 VITALS
DIASTOLIC BLOOD PRESSURE: 88 MMHG | SYSTOLIC BLOOD PRESSURE: 122 MMHG | WEIGHT: 216 LBS | HEART RATE: 76 BPM | TEMPERATURE: 98 F | HEIGHT: 69 IN | BODY MASS INDEX: 31.99 KG/M2

## 2020-07-24 DIAGNOSIS — G47.26 SHIFT WORK SLEEP DISORDER: ICD-10-CM

## 2020-07-24 DIAGNOSIS — G47.33 OSA (OBSTRUCTIVE SLEEP APNEA): Primary | ICD-10-CM

## 2020-07-24 PROCEDURE — 99213 OFFICE O/P EST LOW 20 MIN: CPT | Performed by: NURSE PRACTITIONER

## 2020-07-24 PROCEDURE — 3079F DIAST BP 80-89 MM HG: CPT | Performed by: NURSE PRACTITIONER

## 2020-07-24 PROCEDURE — 3008F BODY MASS INDEX DOCD: CPT | Performed by: NURSE PRACTITIONER

## 2020-07-24 PROCEDURE — 3074F SYST BP LT 130 MM HG: CPT | Performed by: NURSE PRACTITIONER

## 2020-07-24 NOTE — PROGRESS NOTES
Noris Dash is a 45year old male. Patient presents with: Other: MARIANA rm 11 FMLA paperwork      HPI:   Here to discuss FMLA   Received letter from employer to provide documentation regarding pattern of his missed days/use of FMLA.   Per the employer docum Oral Tab Take 1 tablet (10 mg total) by mouth once daily. 90 tablet 3   • Budesonide-Formoterol Fumarate (SYMBICORT) 160-4.5 MCG/ACT Inhalation Aerosol Inhale 2 puffs into the lungs 2 (two) times daily.  3 Inhaler 3   • atorvastatin (LIPITOR) 10 MG Oral Tab No prescriptions requested or ordered in this encounter       Imaging & Consults:  PULMONARY - INTERNAL    No follow-ups on file. There are no Patient Instructions on file for this visit.

## 2020-09-15 NOTE — TELEPHONE ENCOUNTER
Last Ov:7/24/20  Upcoming appt:none  Last labs:1/31/20 A1C  Last Rx:7/11/20 metformin HCl 500    Per Protocol sent for review

## 2020-09-18 RX ORDER — ALBUTEROL SULFATE 90 UG/1
AEROSOL, METERED RESPIRATORY (INHALATION)
Qty: 8.5 G | Refills: 0 | Status: SHIPPED | OUTPATIENT
Start: 2020-09-18 | End: 2020-11-06

## 2020-09-18 NOTE — TELEPHONE ENCOUNTER
Last VISIT 07/24/20    Last REFILL 06/15/20 qty 8.5g w/0 refills    Last LABS 01/31/20 A1c done    No future appointments. Per PROTOCOL? Failed    Please Approve or Deny.

## 2020-10-12 NOTE — TELEPHONE ENCOUNTER
Last OV: 1/31/20 annual PE    No future appointments.      Latest labs: 1/31/20 A1c 3/13/19 Vit B12, Vit D, TSH, Microalb, A1c, Lipid and CMP    Latest RX: Metformin Hydrochloride 500 Mg Tab Avet 60 tabs 0 refills on 9/15/20    Per protocol, failed due to H

## 2020-10-14 NOTE — TELEPHONE ENCOUNTER
Per pt     I will schedule an appointment as soon as I can. I am not allowed days off for work and even having a doctors note will not cover discipline policies.   I no longer have fmla either, so when I actually get a day off, and I can schedule for same

## 2020-10-14 NOTE — TELEPHONE ENCOUNTER
Orders are in for labs. Please have him focus more on getting labs done than ov with us as he has been here in 2020.

## 2020-10-15 NOTE — TELEPHONE ENCOUNTER
Called and LVM for patient on spouse number due to his VM box is full. Advised for patient per provider to focus on getting labs completed first and to worry about getting seen in office later due to patient has trouble with getting time off work.

## 2020-11-06 RX ORDER — ALBUTEROL SULFATE 90 UG/1
AEROSOL, METERED RESPIRATORY (INHALATION)
Qty: 8.5 G | Refills: 0 | Status: SHIPPED | OUTPATIENT
Start: 2020-11-06 | End: 2021-01-11

## 2020-11-06 NOTE — TELEPHONE ENCOUNTER
Last VISIT 07/24/20    Last REFILL 09/18/20 qty 8.5 g w/0 refills    Last LABS No recent labs done    No future appointments. Per PROTOCOL? Failed    Please Approve or Deny.

## 2020-11-09 NOTE — TELEPHONE ENCOUNTER
LOV  7-24-20  No future    LF  10-12-20    One month pended.         Needs to have labs done    Please approve or deny

## 2020-11-30 NOTE — TELEPHONE ENCOUNTER
Last Ov: 7/24/20, SD, paperwork  Last labs: A1c 1/31/20  Last Rx: metformin 500mg, #60, 0R 11/9/20    No future appointments. Per Protocol - failed. Pt due for microalb/creat, A1c, and last A1c out of range. Rx pending.

## 2021-01-11 RX ORDER — ALBUTEROL SULFATE 90 UG/1
AEROSOL, METERED RESPIRATORY (INHALATION)
Qty: 8.5 G | Refills: 0 | Status: SHIPPED | OUTPATIENT
Start: 2021-01-11 | End: 2021-02-06

## 2021-01-11 NOTE — TELEPHONE ENCOUNTER
Last OV: 1/31/20 annual PE    No future appointments. Latest labs: 1/31/20 A1c    Latest RX: Albuterol- 8.5 g 0 refills on 11/6/20    Per protocol, failed due to AAP/ACT given in last 12 months. Rx pending.

## 2021-02-06 RX ORDER — ALBUTEROL SULFATE 90 UG/1
AEROSOL, METERED RESPIRATORY (INHALATION)
Qty: 8.5 G | Refills: 0 | Status: SHIPPED | OUTPATIENT
Start: 2021-02-06 | End: 2021-03-18

## 2021-03-18 RX ORDER — ALBUTEROL SULFATE 90 UG/1
AEROSOL, METERED RESPIRATORY (INHALATION)
Qty: 8.5 G | Refills: 0 | Status: SHIPPED | OUTPATIENT
Start: 2021-03-18 | End: 2021-06-16

## 2021-03-18 NOTE — TELEPHONE ENCOUNTER
Pt due for physical exam. Please call pt to schedule an appointment.          Last visit-  01/31/2020    Last refill-  02/06/2021 albuterol sulfate hfa 108 (90 base) mcg/act inhalation aero soln QTY8.5g 0R    Last labs-  03/10/2021 urinalysis, cbc, cmp    N

## 2021-06-15 DIAGNOSIS — G47.33 OSA (OBSTRUCTIVE SLEEP APNEA): Primary | ICD-10-CM

## 2021-06-15 DIAGNOSIS — E11.9 TYPE 2 DIABETES MELLITUS WITHOUT COMPLICATION, WITHOUT LONG-TERM CURRENT USE OF INSULIN (HCC): ICD-10-CM

## 2021-06-15 NOTE — TELEPHONE ENCOUNTER
Last OV: 7/24/20 f/u    No future appointments.      Latest labs: ACT- 1/31/20 score 20    Latest RX: Albuterol- 8.5g 0 refills on 3/18/21    Per protocol, failed due to Asthma Action Score greater than or equal to 20, Appointment in past 6 or next 3 months

## 2021-06-16 RX ORDER — ALBUTEROL SULFATE 90 UG/1
AEROSOL, METERED RESPIRATORY (INHALATION)
Qty: 8.5 G | Refills: 0 | Status: SHIPPED | OUTPATIENT
Start: 2021-06-16 | End: 2021-08-23

## 2021-06-22 NOTE — TELEPHONE ENCOUNTER
Spoke with pt, he will get his labs done but he can't schedule appt at this time as he is on call 24hrs currently. Once that ends he will call to schedule.

## 2021-08-05 ENCOUNTER — TELEPHONE (OUTPATIENT)
Dept: INTERNAL MEDICINE CLINIC | Facility: CLINIC | Age: 46
End: 2021-08-05

## 2021-08-05 DIAGNOSIS — F41.9 ANXIETY: ICD-10-CM

## 2021-08-05 RX ORDER — ESCITALOPRAM OXALATE 10 MG/1
TABLET ORAL
Qty: 30 TABLET | Refills: 0 | Status: SHIPPED | OUTPATIENT
Start: 2021-08-05 | End: 2021-08-26

## 2021-08-05 NOTE — TELEPHONE ENCOUNTER
Last OV: 7/24/20 paperwork    No future appointments. Latest labs: 3/10/21 CMP and CBC    Latest RX: Escitalopram Oxalate 10 Mg Tab Solc 90 tabs 3 refills on 1/31/20    Per protocol, not on protocol. Rx pending.

## 2021-08-21 ENCOUNTER — TELEPHONE (OUTPATIENT)
Dept: INTERNAL MEDICINE CLINIC | Facility: CLINIC | Age: 46
End: 2021-08-21

## 2021-08-23 RX ORDER — ALBUTEROL SULFATE 90 UG/1
AEROSOL, METERED RESPIRATORY (INHALATION)
Qty: 8.5 G | Refills: 0 | Status: SHIPPED | OUTPATIENT
Start: 2021-08-23 | End: 2021-11-08

## 2021-08-23 NOTE — TELEPHONE ENCOUNTER
Last OV: 7/24/20 TONY  Last PE: 1/31/20    Future Appointments   Date Time Provider Franco Bowden   9/4/2021 10:15 AM [facility] - [address]        Latest labs: 1/31/20- ACT score 20    Latest RX: Albuterol Sulfate Hfa 108 Mcg/A

## 2021-08-23 NOTE — TELEPHONE ENCOUNTER
Needs ov. Please call to schedule. We have attempted to contact him previously with other medications. If unable to schedule, back to me  Will need certified letter.

## 2021-08-26 ENCOUNTER — TELEPHONE (OUTPATIENT)
Dept: INTERNAL MEDICINE CLINIC | Facility: CLINIC | Age: 46
End: 2021-08-26

## 2021-08-26 DIAGNOSIS — F41.9 ANXIETY: ICD-10-CM

## 2021-08-26 RX ORDER — ESCITALOPRAM OXALATE 10 MG/1
10 TABLET ORAL DAILY
Qty: 30 TABLET | Refills: 0 | Status: SHIPPED | OUTPATIENT
Start: 2021-08-26

## 2021-08-26 NOTE — TELEPHONE ENCOUNTER
Been Following SD  Last OV 7/24/20  Last CPE 1/31/20  Last Labs CMP, CBC 3/10/21    Last Rx fill escitalopram 10mg #30 0R 8/5/21    Future Appointments   Date Time Provider Franco Bowden   9/4/2021 10:15 AM [place] [facility]

## 2021-10-02 ENCOUNTER — TELEPHONE (OUTPATIENT)
Dept: INTERNAL MEDICINE CLINIC | Facility: CLINIC | Age: 46
End: 2021-10-02

## 2021-10-05 RX ORDER — MONTELUKAST SODIUM 10 MG/1
TABLET ORAL
Qty: 15 TABLET | Refills: 0 | Status: SHIPPED | OUTPATIENT
Start: 2021-10-05

## 2021-10-05 NOTE — TELEPHONE ENCOUNTER
Last VISIT - 7/24/20    Last CPE - 1/31/20    Last REFILL -     metFORMIN HCl 500 MG Oral Tab 180 tablet 0 11/30/2020     Montelukast Sodium 10 MG Oral Tab 90 tablet 3 1/31/2020     Last LABS - 3/10/21 CBC, CMP    No future appointments. Per PROTOCOL?

## 2021-10-05 NOTE — TELEPHONE ENCOUNTER
Patient overdue for all labs and ov. We have attempted to contact him to schedule on several occasions. Please attempt to schedule. 15 day supply sent.

## 2021-10-07 NOTE — TELEPHONE ENCOUNTER
Called pt on mobile line. No answer. Left vm to advise. Sending to front office as well to attempt to schedule.

## 2021-11-08 RX ORDER — ALBUTEROL SULFATE 90 UG/1
AEROSOL, METERED RESPIRATORY (INHALATION)
Qty: 8.5 G | Refills: 0 | Status: SHIPPED | OUTPATIENT
Start: 2021-11-08

## 2021-11-08 NOTE — TELEPHONE ENCOUNTER
Last OV: 7/24/20 other f/u  Last PE: 1/31/20- SD    No future appointments.      Latest labs: ACT 3/19/20- score 21    Latest RX: 8.5 g 0 refills on 8/23/21    Per protocol, Albuterol failed due to Asthma Action Score greater than or equal to 20, Appointmen

## 2021-11-08 NOTE — TELEPHONE ENCOUNTER
Patient has not responded to our request for ov. Last ov Juy 2020. Please reach out to patient to schedule.

## 2021-11-10 RX ORDER — BUDESONIDE AND FORMOTEROL FUMARATE DIHYDRATE 160; 4.5 UG/1; UG/1
AEROSOL RESPIRATORY (INHALATION)
Qty: 30.6 G | Refills: 0 | Status: SHIPPED | OUTPATIENT
Start: 2021-11-10

## 2021-11-10 NOTE — TELEPHONE ENCOUNTER
Last OV: 7/24/20 - f/u  Last PE: 1/31/20    No future appointments.      Latest labs: ACT 1/30/20- score 20 ( ACT is overdue)    Latest RX: Symbicort- 3 inhalers 3 refills on 1/31/20    Per protocol, failed due to Asthma Action Score greater than or equal t

## 2021-11-18 ENCOUNTER — TELEPHONE (OUTPATIENT)
Dept: INTERNAL MEDICINE CLINIC | Facility: CLINIC | Age: 46
End: 2021-11-18

## 2021-12-01 ENCOUNTER — TELEPHONE (OUTPATIENT)
Dept: INTERNAL MEDICINE CLINIC | Facility: CLINIC | Age: 46
End: 2021-12-01

## 2021-12-01 NOTE — TELEPHONE ENCOUNTER
I attempted to call patient and VM said he was not available with a long pause. I called and left message on wife's  Modesto Lucina asking that she or patient call us back about appointment scheduled and that we would like try to find a sooner visit.      I wou

## 2021-12-01 NOTE — TELEPHONE ENCOUNTER
Future Appointments   Date Time Provider Franco Bowden   12/30/2021 10:00 AM Eric Berry PA-C [facility]     Orders to edward- Pt informed that labs need to be completed no sooner than 2 weeks prior to the appt.  Pt aware to fast-no call back required

## 2022-01-17 ENCOUNTER — TELEPHONE (OUTPATIENT)
Dept: INTERNAL MEDICINE CLINIC | Facility: CLINIC | Age: 47
End: 2022-01-17

## 2022-01-17 NOTE — TELEPHONE ENCOUNTER
Spoke with patient informing him that since he has not been compliant with appointments and getting his labs completed that we were cancelling his appointment with Cecy Adhikari on 1-20-22 and would be dismissing him from our practice.   Patient responded,

## 2023-02-01 ENCOUNTER — TELEPHONE (OUTPATIENT)
Dept: INTERNAL MEDICINE CLINIC | Facility: CLINIC | Age: 48
End: 2023-02-01

## 2023-02-23 ENCOUNTER — PATIENT OUTREACH (OUTPATIENT)
Dept: CASE MANAGEMENT | Age: 48
End: 2023-02-23

## 2025-04-09 NOTE — TELEPHONE ENCOUNTER
Last Office Visit: 3-19-19 with SD for follow up   Last Rx Filled: 6-21-19 90 tabs with no refills   Last Labs: 3-19-19 vitamin B12/vitamin D/tsh  Future Appointment: none    Per protocol to provider denies

## (undated) NOTE — LETTER
07/16/21        Dana Kinds  [address]  [place] [address]      Dear Elaine Callejas,    [facility] records indicate that you have outstanding lab work and or testing that was ordered for you and has not yet been completed:  Orders Placed This Encounter      CM

## (undated) NOTE — LETTER
ASTHMA ACTION PLAN for Fran Spears     : 1975     Date: 2020  Provider:  ERIC Duke  Phone for doctor or clinic: [address] [facility], [address]. [address], [address] [place] [address]  [phone]

## (undated) NOTE — LETTER
ASTHMA ACTION PLAN for Oliva Bill     : 1975     Date: 3/2/2018  Provider:  Gerard Manuel NP  Phone for doctor or clinic: [facility], [address] [place] [address], [address] [place] [address] [phone]

## (undated) NOTE — LETTER
06/09/20        Florentin Erazo  [address]  [facility] [address]      Dear Arti Weinberg,    [address] [facility] records indicate that you have outstanding lab work and or testing that was ordered for you and has not yet been completed.  Due to the current COVID-19 outbreak,

## (undated) NOTE — LETTER
02/21/19        Lisa Ivan  [address] [facility] [address]      Dear Jordy Fish,    [facility] records indicate that you have outstanding FASTING lab work and or testing that was ordered for you and has not yet been completed:  Orders Placed This Encounter

## (undated) NOTE — LETTER
01/21/19        Bonny Imer  [address] [address]      Dear Larry Pearson,    [address] [facility] records indicate that you have outstanding lab work and or testing that was ordered for you and has not yet been completed:  Orders Placed This Encounter        CMP

## (undated) NOTE — LETTER
ASTHMA ACTION PLAN for Anjelica Keys     : 1975     Date: 2017  Provider:  Jairo Posey NP  Phone for doctor or clinic: [facility], [address] [facility] [place] [address] [place] [address] [phone] Patient Caretaker

## (undated) NOTE — LETTER
ASTHMA ACTION PLAN for Anjelica Keys     : 1975     Date: 2018  Provider:  Jairo Posey NP  Phone for doctor or clinic: [facility], [address] [facility], [address] [place] [address], [address] [place] [address] [phone]

## (undated) NOTE — LETTER
10/17/19        Russell Sandoval  [address] [address]      Dear Jayda Melissa,    [address] [facility] records indicate that you have outstanding lab work and or testing that was ordered for you and has not yet been completed:  Orders Placed This Encounter      CM

## (undated) NOTE — LETTER
05/19/20        Martha Berry  [address]  [facility] [address]      Dear Angelo Pacheco,    [address] [facility] records indicate that you have outstanding lab work and or testing that was ordered for you and has not yet been completed:  Orders Placed This Encounter      CM